# Patient Record
Sex: MALE | Race: WHITE | Employment: OTHER | URBAN - METROPOLITAN AREA
[De-identification: names, ages, dates, MRNs, and addresses within clinical notes are randomized per-mention and may not be internally consistent; named-entity substitution may affect disease eponyms.]

---

## 2017-10-09 DIAGNOSIS — H90.3 SENSORINEURAL HEARING LOSS, BILATERAL: Primary | ICD-10-CM

## 2017-10-10 ENCOUNTER — OFFICE VISIT (OUTPATIENT)
Dept: AUDIOLOGY | Facility: CLINIC | Age: 77
End: 2017-10-10

## 2017-10-10 DIAGNOSIS — H90.3 SENSORY HEARING LOSS, BILATERAL: Primary | ICD-10-CM

## 2017-10-10 DIAGNOSIS — H90.3 SENSORINEURAL HEARING LOSS, BILATERAL: ICD-10-CM

## 2017-10-10 NOTE — PROGRESS NOTES
"AUDIOLOGY REPORT    BACKGROUND INFORMATION: Hernesto Noland was seen in Audiology at the [facility] for cochlear implant equipment troubleshooting and discussion of equipment options on 10/10/17.  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      The patient was last in the clinic in 2011, when he upgraded to the Freedom for N22 processor.  Today he arrives wearing his backup Spectra processor, since the Freedom BTE is malfunctioning.  Dr. Ojeda ordered today's visit.     TEST RESULTS AND PROCEDURES: 15 minutes were spent troubleshooting equipment.  The patient arrives with #2 magnet in Spectra which is causing indentation in his skin (no skin breakdown).  This was reduced to 1/2 today.  The Freedom coil has a #1 magnet.  The patient is not using this device.  I will order another 1/2 magnet so we can update this at his next visit.      Patient reports Freedom processor now has \"static\" sound and increasing the volume to maximum no longer provides sufficient volume.  We discussed that this suggests a processor issue.  The device is obsolete and not repairable.  He would like to upgrade to N6 and keep using the Spectra in the meantime, with the Freedom serving as a 2nd backup.    We discussed the N6 processor in detail and completed the order form.  Patient indicated he would not use the wireless accessories and therefore selected an extra rechargeable battery and a  remote as his accessories.  Discussion of N6 order form and upgrade process via insurance took 15 minutes.     SUMMARY AND RECOMMENDATIONS: Cochlear implant equipment troubleshooting was completed today.  Magnet strength was reduced in the Spectra processor that the patient is wearing.  His [place] magnet will be updated at the next visit, since a magnet was ordered " today.  Magnet strength will be rechecked at this next visit.  Patient's Chappells processor has been determined to be faulty.  Since it is obsolete, we will proceed with N6 upgrade.  Letter of medical necessity, insurance information and order form will be sent to [employer] later this week, after Dr. Ojeda signs the letter of medical necessity.  Patient scheduled fitting for 12/7/17 and follow up for 1/4/18.  The equipment will ship to him and he will bring it to the fitting visit charged.  The patient expressed understanding and agreement with this plan.      Jennifer Pradhan, CCC-A  Licensed Audiologist  [place] #[id number]

## 2017-10-10 NOTE — MR AVS SNAPSHOT
After Visit Summary   10/10/2017    Hernesto Noland    MRN: [id number]           Patient Information     Date Of Birth          1940        Visit Information        Provider Department      10/10/2017 9:00 AM Cynthia Stein AuD M [facility] Audiology        Today's Diagnoses     Sensory hearing loss, bilateral    -  1    Sensorineural hearing loss, bilateral           Follow-ups after your visit        Follow-up notes from your care team     Return in about 8 weeks (around 2017) for CIR 90.      Your next 10 appointments already scheduled     Dec 07, 2017  7:00 AM CST   Cochlear Implant Return with Joselo Leach [facility] ([facility] [place])    [address]  [address] [facility] [address] [phone]              Who to contact     Please call your clinic at [phone] to:    Ask questions about your health    Make or cancel appointments    Discuss your medicines    Learn about your test results    Speak to your doctor   If you have compliments or concerns about an experience at your clinic, or if you wish to file a complaint, please contact [facility] Patient Relations at [phone] or email us at [email or web address]         Additional Information About Your Visit        MyChart Information     Share Your Braint is an electronic gateway that provides easy, online access to your medical records. With DiningCircle, you can request a clinic appointment, read your test results, renew a prescription or communicate with your care team.     To sign up for Share Your Braint visit the website at [email or web address]   You will be asked to enter the access code listed below, as well as some personal information. Please follow the directions to create your username and password.     Your access code is: [email or web address]  Expires: 2018  6:31 AM     Your access code will  in 90 days. If you need help or a new code, please  contact your [facility] or call [phone] for assistance.        Care EveryWhere ID     This is your Care EveryWhere ID. This could be used by other organizations to access your [place] medical records  [id number]         Blood Pressure from Last 3 Encounters:   No data found for BP    Weight from Last 3 Encounters:   No data found for Wt              We Performed the Following     AUDIOLOGY ADULT REFERRAL     Cochlear Implant Troubleshooting (24976)        Primary Care Provider Office Phone # Fax #    Ashlyn Chakraborty [phone]       [facility] [address]  [facility] [address]        Equal Access to Services     SAUL CHAVEZ : Hadii aad ku hadasho Soomaali, waaxda luqadaha, qaybta kaalmada adeegyada, waxay idiin hayaan adeeg milena ty . So [facility] [phone].    ATENCIÓN: Si habla español, tiene a hartman disposición servicios gratuitos de asistencia lingüística. [facility] [phone].    We comply with applicable federal civil rights laws and [place] laws. We do not discriminate on the basis of race, color, national origin, age, disability, sex, sexual orientation, or gender identity.            Thank you!     Thank you for choosing [facility] AUDIOLOGY  for your care. Our goal is always to provide you with excellent care. Hearing back from our patients is one way we can continue to improve our services. Please take a few minutes to complete the written survey that you may receive in the mail after your visit with us. Thank you!             Your Updated Medication List - Protect others around you: Learn how to safely use, store and throw away your medicines at www.disposemymeds.org.      Notice  As of 10/10/2017 10:41 AM    You have not been prescribed any medications.

## 2017-10-11 ENCOUNTER — TELEPHONE (OUTPATIENT)
Dept: AUDIOLOGY | Facility: CLINIC | Age: 77
End: 2017-10-11

## 2017-10-11 NOTE — TELEPHONE ENCOUNTER
Letter of medical necessity, N6 for N22 upgrade form, and insurance information were sent to [employer].        Kristi Pradhan., CCC-A  Licensed Audiologist  [place] #[id number]

## 2017-12-07 ENCOUNTER — OFFICE VISIT (OUTPATIENT)
Dept: AUDIOLOGY | Facility: CLINIC | Age: 77
End: 2017-12-07

## 2017-12-07 DIAGNOSIS — H90.3 SENSORY HEARING LOSS, BILATERAL: Primary | ICD-10-CM

## 2017-12-07 NOTE — PROGRESS NOTES
AUDIOLOGY REPORT    BACKGROUND INFORMATION: Dr. 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    PATIENT REPORT: Mr. Noland arrives on 12/7/17 in Audiology at the [facility] with his N6 for N22 upgrade kit.  He reports both his Spectra and his Freedom processor having recently stopped working and he can no longer get parts.     FITTING SESSION: Dr. Erick Ojeda, cochlear implant surgeon, ordered today's programming appointment. The patient came to the clinic for adjustment to the programs in the external speech processor and for assessment of the external components of the cochlear implant system. These components provide power and data to the internal device. Sound is only heard once the external portion is activated. Postoperative treatment, including device fitting and adjustment, audiologic assessments, and training are required at regular intervals.   Processor type: N6 for N22 fit today; Has malfunctioning Spectra and Freedom backups  Magnet strength: #1/2 + moleskin with N6 since there is a small area of irritated skin on his scalp.  Once this resolves, we will try to take the moleskin off.  Freedom previously had #1 magnet which was changed to #1/2 today.  Spectra was previously adjusted to #1/2.  He reports he previously screwed the magnet all the way down in the coil, against his head.  He was advised not to do that again due to the skin irritation.  No skin breakdown.     TEST RESULTS:   Electrode Impedances: Cannot test - N22  Neural Response Testing: Cannot test - N22  Facial Stimulation: Absent  Tinnitus: Absent  Balance Problems: Absent  Pain/Discomfort: Absent  Strategies Tried: SPEAK    Programs in N6:  1. (51) ADRO with wind reduction  2. (51) SCAN (ADRO + autosensitivity) with noise and wind  reduction  Telecoil is off. Volume and sensitivity are available in the remote.      Number of Channels per Program: 18 (22, 21, 3 & 2 are off)    COMMENTS: The preferred Freedom program #50 was converted to the N6 and volume was comfortable.  This was saved as program #51.  Patient requested wind reduction be added to the primary program but otherwise wanted it exactly like his old processor's program.  A SCAN program was also created with additional features.      All of the equipment in the N6 processor kit was reviewed including the  remote.  The patient was given time to practice use of the device and placement/removal of processor.  The patient's questions were answered.  Warranties were reviewed.    SUMMARY AND RECOMMENDATIONS: Mr. Noland was upgraded to the N6 for N22 processor today.  He will use moleskin for the next month and will return 1/4/18 for magnet/skin check, programming changes if needed, equipment review and speech perception testing.  The patient expressed understanding and agreement with this plan.      Jennifer Pradhan, CCC-A  Licensed Audiologist  [place] #[id number]

## 2017-12-07 NOTE — MR AVS SNAPSHOT
After Visit Summary   2017    Hernesto Noland    MRN: [id number]           Patient Information     Date Of Birth          1940        Visit Information        Provider Department      2017 7:00 AM Cynthia Stein AuD M [facility] Audiology        Today's Diagnoses     Sensory hearing loss, bilateral    -  1       Follow-ups after your visit        Your next 10 appointments already scheduled     2018  7:00 AM CST   Cochlear Implant Return with Joselo Leach [facility] Audiology ([facility] [place])    [address] [place] [address] [phone]              Who to contact     Please call your clinic at [phone] to:    Ask questions about your health    Make or cancel appointments    Discuss your medicines    Learn about your test results    Speak to your doctor   If you have compliments or concerns about an experience at your clinic, or if you wish to file a complaint, please contact [facility] Patient Relations at [phone] or email us at [email or web address]         Additional Information About Your Visit        MyChart Information     Gobble is an electronic gateway that provides easy, online access to your medical records. With Gobble, you can request a clinic appointment, read your test results, renew a prescription or communicate with your care team.     To sign up for Gobble visit the website at [email or web address]   You will be asked to enter the access code listed below, as well as some personal information. Please follow the directions to create your username and password.     Your access code is: [email or web address]  Expires: 2018  5:31 AM     Your access code will  in 90 days. If you need help or a new code, please contact your [facility] or call [phone] for assistance.        Care EveryWhere ID     This is your Care EveryWhere  ID. This could be used by other organizations to access your [place] medical records  [id number]         Blood Pressure from Last 3 Encounters:   No data found for BP    Weight from Last 3 Encounters:   No data found for Wt              We Performed the Following     LT: Diagnostic Analysis of CI 7 yrs & over, Initial Programming (39914)        Primary Care Provider Office Phone # Fax #    Ashlyn Chakraborty [phone]       [address] [address]        Equal Access to Services     [facility] Hospital[facility] : Hadii aad ku hadasho Soomaali, waaxda luqadaha, qaybta kaalmada adeegyada, waxay idiin hayaan adeeg raearatariq ty . So [facility] [phone].    ATENCIÓN: Si habla español, tiene a hartman disposición servicios gratuitos de asistencia lingüística. [facility] [phone].    We comply with applicable federal civil rights laws and [place] laws. We do not discriminate on the basis of race, color, national origin, age, disability, sex, sexual orientation, or gender identity.            Thank you!     Thank you for choosing [facility] AUDIOLOGY  for your care. Our goal is always to provide you with excellent care. Hearing back from our patients is one way we can continue to improve our services. Please take a few minutes to complete the written survey that you may receive in the mail after your visit with us. Thank you!             Your Updated Medication List - Protect others around you: Learn how to safely use, store and throw away your medicines at www.disposemymeds.org.      Notice  As of 12/7/2017  8:24 AM    You have not been prescribed any medications.

## 2017-12-11 ENCOUNTER — OFFICE VISIT (OUTPATIENT)
Dept: AUDIOLOGY | Facility: CLINIC | Age: 77
End: 2017-12-11

## 2017-12-11 DIAGNOSIS — H90.3 SENSORY HEARING LOSS, BILATERAL: Primary | ICD-10-CM

## 2017-12-11 NOTE — MR AVS SNAPSHOT
After Visit Summary   2017    Hernesto Noland    MRN: [id number]           Patient Information     Date Of Birth          1940        Visit Information        Provider Department      2017 7:00 AM Cynthia Stein AuD M [facility]        Today's Diagnoses     Sensory hearing loss, bilateral    -  1       Follow-ups after your visit        Your next 10 appointments already scheduled     2018  7:00 AM CST   Cochlear Implant Return with Joselo Leach [facility] ([email or web address] [place])    [address] [place] [address] [phone]              Who to contact     Please call your clinic at [phone] to:    Ask questions about your health    Make or cancel appointments    Discuss your medicines    Learn about your test results    Speak to your doctor   If you have compliments or concerns about an experience at your clinic, or if you wish to file a complaint, please contact [facility] Patient Relations at [phone] or email us at [email or web address]         Additional Information About Your Visit        MyChart Information     Newshubby is an electronic gateway that provides easy, online access to your medical records. With Newshubby, you can request a clinic appointment, read your test results, renew a prescription or communicate with your care team.     To sign up for Newshubby visit the website at [email or web address]   You will be asked to enter the access code listed below, as well as some personal information. Please follow the directions to create your username and password.     Your access code is: [email or web address]  Expires: 2018  5:31 AM     Your access code will  in 90 days. If you need help or a new code, please contact your [facility] or call [phone] for assistance.        Care EveryWhere ID     This is your Care  EveryWhere ID. This could be used by other organizations to access your [place] medical records  [id number]         Blood Pressure from Last 3 Encounters:   No data found for BP    Weight from Last 3 Encounters:   No data found for Wt              We Performed the Following     No Charge, Hearing Aid Clinic Visit        Primary Care Provider Office Phone # Fax #    Ashlyn Chakraborty [phone]       [facility] [address]  [facility] [address]        Equal Access to Services     PORFIRIO CHAVEZ : Hadii aad ku hadasho Soomaali, waaxda luqadaha, qaybta kaalmada adeegyada, waxay idiin hayaan adeeg kharash lakyle . So [facility] [phone].    ATENCIÓN: Si habla español, tiene a hartman disposición servicios gratuitos de asistencia lingüística. [facility] [phone].    We comply with applicable federal civil rights laws and [place] laws. We do not discriminate on the basis of race, color, national origin, age, disability, sex, sexual orientation, or gender identity.            Thank you!     Thank you for choosing [facility] AUDIOLOGY  for your care. Our goal is always to provide you with excellent care. Hearing back from our patients is one way we can continue to improve our services. Please take a few minutes to complete the written survey that you may receive in the mail after your visit with us. Thank you!             Your Updated Medication List - Protect others around you: Learn how to safely use, store and throw away your medicines at www.disposemymeds.org.      Notice  As of 12/11/2017  7:56 AM    You have not been prescribed any medications.

## 2017-12-11 NOTE — PROGRESS NOTES
AUDIOLOGY REPORT    BACKGROUND INFORMATION:  Dr. 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    The patient walked in to Audiology at the [facility] for cochlear implant troubleshooting on 12/11/17.  He was fit with an upgraded N6 processor on 12/7/17.  He reports he can no longer get his battery to twist onto the processor, like he could last week.      TEST RESULTS AND PROCEDURES: Visual inspection revealed that the processor lock was activated.  It was moved to the unlock position and then the battery twisted on without issue.  This was demonstrated to the patient.    It was noted that he had removed the moleskin from the coil which is using a 1/2 magnet.  The skin irritation that was noted last week from the old processor was no longer present and retention felt appropriate today.     SUMMARY AND RECOMMENDATIONS: Patient was seen for cochlear implant troubleshooting and issues were resolved with processor battery lock.  Patient is using 1/2 magnet without moleskin with no irritation.  He will return for follow up in January 2018 as planned.    The patient expressed understanding and agreement with this plan.      Kristi Pradhan., CCC-A  Licensed Audiologist  [place] #[id number]

## 2018-01-04 ENCOUNTER — OFFICE VISIT (OUTPATIENT)
Dept: OTOLARYNGOLOGY | Facility: CLINIC | Age: 78
End: 2018-01-04
Payer: MEDICARE

## 2018-01-04 ENCOUNTER — OFFICE VISIT (OUTPATIENT)
Dept: AUDIOLOGY | Facility: CLINIC | Age: 78
End: 2018-01-04
Payer: MEDICARE

## 2018-01-04 DIAGNOSIS — H90.3 SENSORY HEARING LOSS, BILATERAL: Primary | ICD-10-CM

## 2018-01-04 DIAGNOSIS — H90.3 SENSORINEURAL HEARING LOSS (SNHL) OF BOTH EARS: Primary | ICD-10-CM

## 2018-01-04 DIAGNOSIS — H61.23 BILATERAL IMPACTED CERUMEN: ICD-10-CM

## 2018-01-04 PROBLEM — E87.6 HYPOKALEMIA: Status: ACTIVE | Noted: 2017-11-06

## 2018-01-04 PROBLEM — R19.5 LOOSE STOOLS: Status: ACTIVE | Noted: 2017-11-06

## 2018-01-04 PROBLEM — H25.093 AGE-RELATED INCIPIENT CATARACT OF BOTH EYES: Status: ACTIVE | Noted: 2017-08-09

## 2018-01-04 PROBLEM — S72.001P CLOSED FRACTURE OF RIGHT HIP WITH MALUNION: Status: ACTIVE | Noted: 2017-10-26

## 2018-01-04 RX ORDER — DORZOLAMIDE HYDROCHLORIDE AND TIMOLOL MALEATE 20; 5 MG/ML; MG/ML
1 SOLUTION/ DROPS OPHTHALMIC
COMMUNITY

## 2018-01-04 RX ORDER — OFLOXACIN 3 MG/ML
2 SOLUTION AURICULAR (OTIC) 2 TIMES DAILY
Qty: 2 ML | Refills: 0 | Status: SHIPPED | OUTPATIENT
Start: 2018-01-04 | End: 2018-01-14

## 2018-01-04 NOTE — PROGRESS NOTES
The patient presents with a history of cerumen impaction in the ears.  The patient has profound hearing loss and he was scheduled to work with the Audiologists today, but his ears were severely impacted. The patient denies sinusitis, rhinitis, facial pain, nasal obstruction or purulent nasal discharge. The patient denies chronic or recurrent tonsillitis, chronic or recurrent pharyngitis. The patient denies otalgia, otorrhea, eustachian tube dysfunction, ear infections, dizziness or tinnitus.     This patient is seen in consultation at the request of Gabrielle Ruiz.     All other systems were reviewed and they are either negative or they are not directly pertinent to this Otolaryngology examination.      Past Medical History:    No past medical history on file.    Past Surgical History:    No past surgical history on file.    Medications:    No current outpatient prescriptions on file.    Allergies:    Review of patient's allergies indicates not on file.    Physical Examination:    The patient is a well developed, well nourished male in no apparent distress.  He is normocephalic, atraumatic with pupils equally round and reactive to light.    Oral Cavity Examination:  Normal mucosa with no masses or lesions  Nasal Examination: Normal mucosa with no masses or lesions  Ear Examination: Ear canals impacted with cerumen bilaterally.  The ear canals are cleaned today using an alligator forceps, a currette, and a suction using a binocular microscope for visualization. The tympanic membranes and middle ear spaces normal bilaterally.  Neurological Examination: Facial nerve function intact and symmetric  Integumentary Examination: No lesions on the skin of the head and neck  Neck Examination: No masses or lesions, no lymphadenopathy  Endocrine Examination: Normal thyroid examination    Assessment and Plan:    The patient presents with a history of cerumen impaction.  The patient's ears are cleaned today. He will use floxin drops  for one week and will proceed with his Audiology visit.

## 2018-01-04 NOTE — PATIENT INSTRUCTIONS
The patient presents with a history of cerumen impaction.  The patient's ears are cleaned today. He will use floxin drops for one week and will proceed with his Audiology visit.

## 2018-01-04 NOTE — PROGRESS NOTES
AUDIOLOGY REPORT    METHOD: Speech perception testing is conducted at regular intervals to determine the degree of benefit the patient is obtaining from the cochlear implant (CI). Tests are conducted using the cochlear implant without the benefit of lipreading. All tests are conducted in a sound-treated room. Perception of monosyllabic words and words in sentences are tested. Results usually show improvement over time. A decrease in performance indicates the need for re-programming of the prosthesis and/or replacement of components.     INTERVAL: 22 years     BACKGROUND: Dr. 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    The patient was seen for upgrade follow up and cochlear implant testing in to Audiology at the [facility] on 1/4/18.  He was fit with an upgraded N6 processor on 12/7/17.     Today's evaluation was ordered by Dr. Ojeda.    PATIENT REPORT: The patient reports he has adjusted well to the new N6 for N22 processor.  The patient reports he hears much better with the new processor than he did with his old equipment.  He does not feel any programming changes or equipment review are needed.  He feels ready for testing.       TEST RESULTS:  35 minutes were spent assessing the patient s auditory rehabilitation status.    Device(s) used for Testing:   Right ear: Not aided  Left ear: N6 for N22 cochlear implant sound processor; #1/2 magnet without irritation    Soundfield Aided Thresholds: Detection in borderline normal to mild loss range  Otoscopy: Significant cerumen bilaterally, possibly fully occluding  Tympanograms: Flat bilaterally.  Volume is smaller on right side compared to left and smaller than typical bilaterally.  Recordings likely affected by cerumen.  Therefore, I contacted Dr. Martinez's clinic and  Dr. Martinez indicated he would add the patient on for ear cleaning and middle ear assessment after today's Audiology appointment.   Unaided thresholds: Did not test due to cerumen    NOTE: No previous preoperative or postoperative test results could be found, though testing was likely done at 70 dB SPL in the past.  Testing today was done at 60 dB A to be consistent with current testing recommendations.  While today's results could be compared to any past results, they can be used for future monitoring.     CNC Words Test:  The patient repeats 25 single syllable words, auditory only. The words are presented at 60 dB A (conversational level) delivered from a CD player.    Preoperative Performance: Could not locate results.     Left CI:  22 years (today): 12%       The Hearing in Noise Test (HINT):  The patient repeats 20 sentences, auditory only.   The sentences are presented in each condition at 60 dB A (conversational level) delivered from a CD player.    Preoperative Performance: Could not locate results.     Left CI:  22 years (today): 26%       SUMMARY AND RECOMMENDATIONS: Mr. Noland has adapted well to the new TDX for N22 sound processor.  Audibility is appropriate and no programming changes were advised based on the results.  Previous speech perception scores could not be located but today's results can be used for future monitoring.  Scores are below average but patient denies changes in performance over time.  There is no longer irritation from the CI magnet.  Patient should return to Audiology in 1 year for repeat testing and follow up.  He is aware of how to change the microphone filters on the processor in the meantime.      Patient has cerumen impaction bilaterally, which likely affected tympanograms today (flat bilaterally).  Patient will be added onto Dr. Martinez's clinic today for ear cleaning and assessment of middle ears.      The patient expressed understanding and agreement with this  plan.      Jennifer Pradhan, CCC-A  Licensed Audiologist  [place] #[id number]    Enclosure: audiogram

## 2018-01-04 NOTE — MR AVS SNAPSHOT
After Visit Summary   2018    Hernesto Noland    MRN: [id number]           Patient Information     Date Of Birth          1940        Visit Information        Provider Department      2018 7:15 AM Rahat Martinez MD [facility]        Today's Diagnoses     Sensorineural hearing loss (SNHL) of both ears    -  1    Bilateral impacted cerumen          Care Instructions    The patient presents with a history of cerumen impaction.  The patient's ears are cleaned today. He will use floxin drops for one week and will proceed with his Audiology visit.           Follow-ups after your visit        Who to contact     Please call your clinic at [phone] to:    Ask questions about your health    Make or cancel appointments    Discuss your medicines    Learn about your test results    Speak to your doctor   If you have compliments or concerns about an experience at your clinic, or if you wish to file a complaint, please contact [facility] Patient Relations at [phone] or email us at [email or web address]         Additional Information About Your Visit        MyChart Information     ProteoMediX is an electronic gateway that provides easy, online access to your medical records. With ProteoMediX, you can request a clinic appointment, read your test results, renew a prescription or communicate with your care team.     To sign up for ProteoMediX visit the website at [email or web address]   You will be asked to enter the access code listed below, as well as some personal information. Please follow the directions to create your username and password.     Your access code is: [email or web address]  Expires: 2018  5:31 AM     Your access code will  in 90 days. If you need help or a new code, please contact your [facility] or call [phone] for assistance.        Care EveryWhere ID     This is your Care EveryWhere  ID. This could be used by other organizations to access your [facility] medical records  [id number]         Blood Pressure from Last 3 Encounters:   No data found for BP    Weight from Last 3 Encounters:   No data found for Wt              We Performed the Following     REMOVE IMPACTED CERUMEN          Today's Medication Changes          These changes are accurate as of: 1/4/18  7:45 AM.  If you have any questions, ask your nurse or doctor.               Start taking these medicines.        Dose/Directions    ofloxacin 0.3 % otic solution   Commonly known as:  FLOXIN   Used for:  Bilateral impacted cerumen   Started by:  Rahat Martinez MD        Dose:  2 drop   Place 2 drops into both ears 2 times daily for 10 days   Quantity:  2 mL   Refills:  0            Where to get your medicines      These medications were sent to [facility] [address] [place] [address] [address] [place] [id number]    Hours:  TRANSPLANT PHONE NUMBER [phone] Phone:  [phone]     ofloxacin 0.3 % otic solution                Primary Care Provider Office Phone # Fax #    Ashlyn Chakraborty [phone]       [facility] [address]  [facility] [address]        Equal Access to Services     PORFIRIO CHAVEZ AH: Hadii shadi ku hadasho Soomaali, waaxda luqadaha, qaybta kaalmada adeegyada, waxay idiin hayscottyn su guo. So [facility] [phone].    ATENCIÓN: Si habla español, tiene a hartman disposición servicios gratuitos de asistencia lingüística. Llame al [phone].    We comply with applicable federal civil rights laws and [place] laws. We do not discriminate on the basis of race, color, national origin, age, disability, sex, sexual orientation, or gender identity.            Thank you!     Thank you for choosing [facility] EAR NOSE AND THROAT  for your care. Our goal is always to provide you with excellent care. Hearing back  from our patients is one way we can continue to improve our services. Please take a few minutes to complete the written survey that you may receive in the mail after your visit with us. Thank you!             Your Updated Medication List - Protect others around you: Learn how to safely use, store and throw away your medicines at www.disposemymeds.org.          This list is accurate as of: 1/4/18  7:45 AM.  Always use your most recent med list.                   Brand Name Dispense Instructions for use Diagnosis    ofloxacin 0.3 % otic solution    FLOXIN    2 mL    Place 2 drops into both ears 2 times daily for 10 days    Bilateral impacted cerumen

## 2018-01-04 NOTE — MR AVS SNAPSHOT
After Visit Summary   2018    Hernesto Noland    MRN: [id number]           Patient Information     Date Of Birth          1940        Visit Information        Provider Department      2018 7:00 AM Cynthia Stein, Joselo ENGLE [facility] Audiology        Today's Diagnoses     Sensory hearing loss, bilateral    -  1       Follow-ups after your visit        Follow-up notes from your care team     Return in about 1 year (around 2019) for CIR 90.      Who to contact     Please call your clinic at [phone] to:    Ask questions about your health    Make or cancel appointments    Discuss your medicines    Learn about your test results    Speak to your doctor   If you have compliments or concerns about an experience at your clinic, or if you wish to file a complaint, please contact [facility] Patient Relations at [phone] or email us at [email or web address]         Additional Information About Your Visit        MyChart Information     MicroQuantt is an electronic gateway that provides easy, online access to your medical records. With Branch, you can request a clinic appointment, read your test results, renew a prescription or communicate with your care team.     To sign up for MicroQuantt visit the website at [email or web address]   You will be asked to enter the access code listed below, as well as some personal information. Please follow the directions to create your username and password.     Your access code is: [email or web address]  Expires: 2018  5:31 AM     Your access code will  in 90 days. If you need help or a new code, please contact your [facility] Clinic or call [phone] for assistance.        Care EveryWhere ID     This is your Care EveryWhere ID. This could be used by other organizations to access your [facility] medical records  [id number]         Blood Pressure from Last 3 Encounters:   No data found for BP     Weight from Last 3 Encounters:   No data found for Wt              We Performed the Following     AUDIOGRAM/TYMPANOGRAM - INTERFACE     Eval of Aud Rehab Status (60 min)   (60132)     Tympanometry (impedance - testing) (73688)        Primary Care Provider Office Phone # Fax #    Ashlyn Chakraborty [phone]       [facility] [address]  [facility] [address]        Equal Access to Services     SAUL [facility] : Hadii aad ku hadasho Soomaali, waaxda luqadaha, qaybta kaalmada adeegyada, waxay idiin hayaan adeeg kharash la'aan . So [facility] [phone].    ATENCIÓN: Si habla español, tiene a hartman disposición servicios gratuitos de asistencia lingüística. Ll[facility] [phone].    We comply with applicable federal civil rights laws and [place] laws. We do not discriminate on the basis of race, color, national origin, age, disability, sex, sexual orientation, or gender identity.            Thank you!     Thank you for choosing [facility] AUDIOLOGY  for your care. Our goal is always to provide you with excellent care. Hearing back from our patients is one way we can continue to improve our services. Please take a few minutes to complete the written survey that you may receive in the mail after your visit with us. Thank you!             Your Updated Medication List - Protect others around you: Learn how to safely use, store and throw away your medicines at www.disposemymeds.org.      Notice  As of 1/4/2018  7:44 AM    You have not been prescribed any medications.

## 2018-01-04 NOTE — LETTER
1/4/2018       RE: Hernesto Noland  [address]  [place] [address]     Dear Colleague,    Thank you for referring your patient, Hernesto Noland, to the [facility] E[facility] TH[facility]AT at [facility]. Please see a copy of my visit note below.    The patient presents with a history of cerumen impaction in the ears.  The patient has profound hearing loss and he was scheduled to work with the Audiologists today, but his ears were severely impacted. The patient denies sinusitis, rhinitis, facial pain, nasal obstruction or purulent nasal discharge. The patient denies chronic or recurrent tonsillitis, chronic or recurrent pharyngitis. The patient denies otalgia, otorrhea, eustachian tube dysfunction, ear infections, dizziness or tinnitus.     This patient is seen in consultation at the request of Gabrielle Ruiz.     All other systems were reviewed and they are either negative or they are not directly pertinent to this Otolaryngology examination.      Past Medical History:    No past medical history on file.    Past Surgical History:    No past surgical history on file.    Medications:    No current outpatient prescriptions on file.    Allergies:    Review of patient's allergies indicates not on file.    Physical Examination:    The patient is a well developed, well nourished male in no apparent distress.  He is normocephalic, atraumatic with pupils equally round and reactive to light.    Oral Cavity Examination:  Normal mucosa with no masses or lesions  Nasal Examination: Normal mucosa with no masses or lesions  Ear Examination: Ear canals impacted with cerumen bilaterally.  The ear canals are cleaned today using an alligator forceps, a currette, and a suction using a binocular microscope for visualization. The tympanic membranes and middle ear spaces normal bilaterally.  Neurological Examination: Facial nerve function intact and symmetric  Integumentary Examination: No lesions on  the skin of the head and neck  Neck Examination: No masses or lesions, no lymphadenopathy  Endocrine Examination: Normal thyroid examination    Assessment and Plan:    The patient presents with a history of cerumen impaction.  The patient's ears are cleaned today. He will use floxin drops for one week and will proceed with his Audiology visit.         Again, thank you for allowing me to participate in the care of your patient.      Sincerely,    Rahat Martinez MD

## 2019-02-04 ENCOUNTER — RECORDS - HEALTHEAST (OUTPATIENT)
Dept: LAB | Facility: CLINIC | Age: 79
End: 2019-02-04

## 2019-02-04 LAB
ALBUMIN SERPL-MCNC: 2 G/DL (ref 3.5–5)
ALP SERPL-CCNC: 149 U/L (ref 45–120)
ALT SERPL W P-5'-P-CCNC: 20 U/L (ref 0–45)
ANION GAP SERPL CALCULATED.3IONS-SCNC: 9 MMOL/L (ref 5–18)
AST SERPL W P-5'-P-CCNC: 25 U/L (ref 0–40)
BASOPHILS # BLD AUTO: 0.1 THOU/UL (ref 0–0.2)
BASOPHILS NFR BLD AUTO: 1 % (ref 0–2)
BILIRUB SERPL-MCNC: 0.4 MG/DL (ref 0–1)
BUN SERPL-MCNC: 13 MG/DL (ref 8–28)
CALCIUM SERPL-MCNC: 8.7 MG/DL (ref 8.5–10.5)
CHLORIDE BLD-SCNC: 109 MMOL/L (ref 98–107)
CO2 SERPL-SCNC: 20 MMOL/L (ref 22–31)
CREAT SERPL-MCNC: 0.96 MG/DL (ref 0.7–1.3)
EOSINOPHIL # BLD AUTO: 0.3 THOU/UL (ref 0–0.4)
EOSINOPHIL NFR BLD AUTO: 3 % (ref 0–6)
ERYTHROCYTE [DISTWIDTH] IN BLOOD BY AUTOMATED COUNT: 14.9 % (ref 11–14.5)
GFR SERPL CREATININE-BSD FRML MDRD: >60 ML/MIN/1.73M2
GLUCOSE BLD-MCNC: 109 MG/DL (ref 70–125)
HCT VFR BLD AUTO: 32.3 % (ref 40–54)
HGB BLD-MCNC: 10.1 G/DL (ref 14–18)
LYMPHOCYTES # BLD AUTO: 1 THOU/UL (ref 0.8–4.4)
LYMPHOCYTES NFR BLD AUTO: 13 % (ref 20–40)
MCH RBC QN AUTO: 31.4 PG (ref 27–34)
MCHC RBC AUTO-ENTMCNC: 31.3 G/DL (ref 32–36)
MCV RBC AUTO: 100 FL (ref 80–100)
MONOCYTES # BLD AUTO: 0.5 THOU/UL (ref 0–0.9)
MONOCYTES NFR BLD AUTO: 7 % (ref 2–10)
NEUTROPHILS # BLD AUTO: 5.8 THOU/UL (ref 2–7.7)
NEUTROPHILS NFR BLD AUTO: 76 % (ref 50–70)
PLATELET # BLD AUTO: 394 THOU/UL (ref 140–440)
PMV BLD AUTO: 9.7 FL (ref 8.5–12.5)
POTASSIUM BLD-SCNC: 3.2 MMOL/L (ref 3.5–5)
PROT SERPL-MCNC: 7 G/DL (ref 6–8)
RBC # BLD AUTO: 3.22 MILL/UL (ref 4.4–6.2)
SODIUM SERPL-SCNC: 138 MMOL/L (ref 136–145)
WBC: 7.7 THOU/UL (ref 4–11)

## 2019-02-05 ENCOUNTER — RECORDS - HEALTHEAST (OUTPATIENT)
Dept: LAB | Facility: CLINIC | Age: 79
End: 2019-02-05

## 2019-02-05 LAB
ALBUMIN SERPL-MCNC: 2.2 G/DL (ref 3.5–5)
ALP SERPL-CCNC: 163 U/L (ref 45–120)
ALT SERPL W P-5'-P-CCNC: 18 U/L (ref 0–45)
ANION GAP SERPL CALCULATED.3IONS-SCNC: 10 MMOL/L (ref 5–18)
AST SERPL W P-5'-P-CCNC: 21 U/L (ref 0–40)
BILIRUB SERPL-MCNC: 0.5 MG/DL (ref 0–1)
BUN SERPL-MCNC: 12 MG/DL (ref 8–28)
CALCIUM SERPL-MCNC: 8.5 MG/DL (ref 8.5–10.5)
CHLORIDE BLD-SCNC: 108 MMOL/L (ref 98–107)
CO2 SERPL-SCNC: 21 MMOL/L (ref 22–31)
CREAT SERPL-MCNC: 0.9 MG/DL (ref 0.7–1.3)
ERYTHROCYTE [DISTWIDTH] IN BLOOD BY AUTOMATED COUNT: 14.8 % (ref 11–14.5)
GFR SERPL CREATININE-BSD FRML MDRD: >60 ML/MIN/1.73M2
GLUCOSE BLD-MCNC: 78 MG/DL (ref 70–125)
HCT VFR BLD AUTO: 34.1 % (ref 40–54)
HGB BLD-MCNC: 11 G/DL (ref 14–18)
LEVETIRACETAM (KEPPRA): 48.9 UG/ML (ref 6–46)
MCH RBC QN AUTO: 31.6 PG (ref 27–34)
MCHC RBC AUTO-ENTMCNC: 32.3 G/DL (ref 32–36)
MCV RBC AUTO: 98 FL (ref 80–100)
PLATELET # BLD AUTO: 414 THOU/UL (ref 140–440)
PMV BLD AUTO: 9.5 FL (ref 8.5–12.5)
POTASSIUM BLD-SCNC: 3.9 MMOL/L (ref 3.5–5)
PROT SERPL-MCNC: 6.7 G/DL (ref 6–8)
RBC # BLD AUTO: 3.48 MILL/UL (ref 4.4–6.2)
SODIUM SERPL-SCNC: 139 MMOL/L (ref 136–145)
WBC: 8.3 THOU/UL (ref 4–11)

## 2019-02-09 ENCOUNTER — RECORDS - HEALTHEAST (OUTPATIENT)
Dept: LAB | Facility: CLINIC | Age: 79
End: 2019-02-09

## 2019-02-11 LAB
ALBUMIN SERPL-MCNC: 2.1 G/DL (ref 3.5–5)
ALP SERPL-CCNC: 153 U/L (ref 45–120)
ALT SERPL W P-5'-P-CCNC: <9 U/L (ref 0–45)
ANION GAP SERPL CALCULATED.3IONS-SCNC: 10 MMOL/L (ref 5–18)
AST SERPL W P-5'-P-CCNC: 14 U/L (ref 0–40)
BILIRUB SERPL-MCNC: 0.4 MG/DL (ref 0–1)
BUN SERPL-MCNC: 11 MG/DL (ref 8–28)
CALCIUM SERPL-MCNC: 8.9 MG/DL (ref 8.5–10.5)
CHLORIDE BLD-SCNC: 110 MMOL/L (ref 98–107)
CO2 SERPL-SCNC: 20 MMOL/L (ref 22–31)
CREAT SERPL-MCNC: 0.91 MG/DL (ref 0.7–1.3)
GFR SERPL CREATININE-BSD FRML MDRD: >60 ML/MIN/1.73M2
GLUCOSE BLD-MCNC: 72 MG/DL (ref 70–125)
POTASSIUM BLD-SCNC: 3.8 MMOL/L (ref 3.5–5)
PROT SERPL-MCNC: 7.2 G/DL (ref 6–8)
SODIUM SERPL-SCNC: 140 MMOL/L (ref 136–145)

## 2019-02-21 ENCOUNTER — RECORDS - HEALTHEAST (OUTPATIENT)
Dept: LAB | Facility: CLINIC | Age: 79
End: 2019-02-21

## 2019-02-21 LAB — LEVETIRACETAM (KEPPRA): 26 UG/ML (ref 6–46)

## 2019-04-16 ENCOUNTER — TELEPHONE (OUTPATIENT)
Dept: AUDIOLOGY | Facility: CLINIC | Age: 79
End: 2019-04-16

## 2019-04-16 NOTE — TELEPHONE ENCOUNTER
Received call from patient's wife, Danae.  Hernesto is in TCU at [facility] and his cochlear implant is providing intermittent sound.  Danae was able to locate the unopened backup cable.  She will place this on his processor when she is back to see him in the TCU tomorrow.  I ordered a replacement cable (service request [id number]) so that they will get a new backup to have on hand.  It will ship to the patient's home.  The patient's wife will contact the clinic if the replacement cable does not resolve the issues.  Danae expressed understanding and agreement with this plan.      Kristi Pradhan., CCC-A  Licensed Audiologist  [place] #[id number]

## 2019-04-18 ENCOUNTER — TELEPHONE (OUTPATIENT)
Dept: AUDIOLOGY | Facility: CLINIC | Age: 79
End: 2019-04-18

## 2019-04-18 NOTE — TELEPHONE ENCOUNTER
Danae, patient's wife, called clinic with update.  She replaced the cable on Hernesto's cochlear implant.  It is no longer intermittent but he feels the cochlear implant isn't as loud as it used to be and he has not changed the volume.  Danae requested that processor be exchanged.  RMA [id number] was submitted.  Replacement will ship to patient's home.  He is in TCU at [facility].  Danae will take replacement processor there and he will try it.  If that doesn't resolve the issue, he could try adjusting the volume with his remote but Danae is unsure where it is as he has never used it before.  She will look for it and will charge it and pair it with the replacement processor if the remote is found.  Patient is unable to come into the clinic for troubleshooting due to being in the TCU.  Danae will update the clinic if problems continue.  If no resolution, we could consider exchanging coil next.         Jennifer Pradhan, CCC-A  Licensed Audiologist  [place] #[id number]

## 2019-04-24 ENCOUNTER — TELEPHONE (OUTPATIENT)
Dept: AUDIOLOGY | Facility: CLINIC | Age: 79
End: 2019-04-24

## 2019-04-24 NOTE — TELEPHONE ENCOUNTER
Patient's wife Danae called.  She received replacement cochlear implant sound processor for Hernesto.  She is unsure how to connect it.  She will be going to see Hernesto in TCU later this morning.  I walked her through how to unplug the cable from the old processor and how to plug it into the new processor.  Advised her that if she needs support while with Hernesto to call Cochlear's recipient troubleshooting line for immediate support [phone].  She will also let [facility] know if there are any ongoing issues once Hernesto is using the new processor.  Danae expressed understanding and agreement with this plan.      Jennifer Pradhan, CCC-A  Licensed Audiologist  [place] #[id number]

## 2019-05-01 ENCOUNTER — TELEPHONE (OUTPATIENT)
Dept: AUDIOLOGY | Facility: CLINIC | Age: 79
End: 2019-05-01

## 2019-05-01 NOTE — TELEPHONE ENCOUNTER
Patient's wife called.  He got new cochlear implant equipment but gets steady yellow light error.  Advised wife to make sure cable is completely plugged into processor.  After this, the yellow light error resolved and processor began working as it should.  They will reach out to the clinic if any additional support is needed.       Jennifer Pradhan, CCC-A  Licensed Audiologist  [place] #[id number]

## 2019-05-02 ENCOUNTER — TELEPHONE (OUTPATIENT)
Dept: OTOLARYNGOLOGY | Facility: CLINIC | Age: 79
End: 2019-05-02

## 2019-05-02 DIAGNOSIS — H91.90 CHANGE IN HEARING: Primary | ICD-10-CM

## 2019-05-02 NOTE — TELEPHONE ENCOUNTER
----- Message from Joselo Leach sent at 5/2/2019  4:36 AM CDT -----  Regarding: order for CI patient  Please enter order for cochlear implant programming and testing.  This patient saw Dr. Ojeda in past.  Please enter under Dr. Cueavs.  She indicated she would sign for Dr Ojeda's CI patients    Thanks,  DH

## 2019-05-08 ENCOUNTER — TELEPHONE (OUTPATIENT)
Dept: AUDIOLOGY | Facility: CLINIC | Age: 79
End: 2019-05-08

## 2019-05-08 NOTE — TELEPHONE ENCOUNTER
Received call from patient's wife Danae.  Hernesto is now out of hospital and home.  She still feels he isn't hearing his best with the cochlear implant after equipment exchanges.  They would like to come in for appointment.  Patient will be scheduled for 5/14/19 at 7:30.  Danae confirmed time.      Jennifer Pradhan, CCC-A  Licensed Audiologist  [place] #[id number]

## 2019-05-14 ENCOUNTER — OFFICE VISIT (OUTPATIENT)
Dept: AUDIOLOGY | Facility: CLINIC | Age: 79
End: 2019-05-14
Payer: COMMERCIAL

## 2019-05-14 DIAGNOSIS — H90.3 SENSORY HEARING LOSS, BILATERAL: Primary | ICD-10-CM

## 2019-05-14 NOTE — PROGRESS NOTES
AUDIOLOGY REPORT    METHOD: Speech perception testing is conducted at regular intervals to determine the degree of benefit the patient is obtaining from the cochlear implant (CI). Tests are conducted using the cochlear implant without the benefit of lipreading. All tests are conducted in a sound-treated room. Perception of monosyllabic words and words in sentences are tested. Results usually show improvement over time. A decrease in performance indicates the need for re-programming of the prosthesis and/or replacement of components.     INTERVAL: 23 years     BACKGROUND: Dr. 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    The patient was seen for cochlear implant troubleshooting, programming and testing in Audiology at the [facility] [place] on 5/14/19.  He was accompanied today by his wife Danae.      Today's evaluation was ordered by Dr. Cuevas.    PATIENT REPORT: The patient has received replacement N6 processor and cable but is here for troubleshooting since his wife reports it isn't working despite our efforts to troubleshoot over the phone.    Of note, the patient fell in January 2019 and hit the back of his head, not near his cochlear implant.  His device continued to work until April 2019 when there were concerns about external equipment.  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  Patient reportedly had been free of seizures for about 27 years before they returned.  His physicians are managing all of these current issues and he has recently returned home from transitional care.      Patient has always relied heavily on lipreading to communicate even with his CI.        TEST RESULTS:    Troubleshooting revealed that the  patient had a Freedom cable/coil plugged into the N6 processor.  When this was replaced with an N6 coil, it still wasn't working because the cable was not firmly pushed into the coil.  When this was fixed, the N6 device started working.      Programming changes were then made to ensure the volume was loud enough.      FITTING SESSION: Dr. Fernanda Cuevas ordered today's programming appointment. The patient came to the clinic for adjustment to the programs in the external speech processor and for assessment of the external components of the cochlear implant system. These components provide power and data to the internal device. Sound is only heard once the external portion is activated. Postoperative treatment, including device fitting and adjustment, audiologic assessments, and training are required at regular intervals.     Processor type: Left N6 for N22; Has malfunctioning Spectra and Freedom backups (right ear is unaided)  Magnet strength: #1/2 without irritation or discomfort.      TEST RESULTS:   Electrode Impedances: Cannot test - N22  Neural Response Testing: Cannot test - N22  Facial Stimulation: Absent  Tinnitus: Absent  Balance Problems: Absent  Pain/Discomfort: Absent  Strategies Tried: SPEAK    Programs in N6:  1. (52) ADRO with wind reduction  2. (52) SCAN (ADRO + autosensitivity) with noise and wind reduction  Telecoil is off. Volume and sensitivity are available in the remote.      Number of Channels per Program: 18 (22, 21, 3 & 2 are off)    COMMENTS: Threshold (T) and comfort (C) levels were increased globally to comfort (8 units) in the left CI.  This was saved as map #52 in the N6.  The Freedom and Spectra were not adjusted today as the patient did not bring them.      After troubleshooting and programming changes, Mr. Noland reported that he was hearing as well as in the past.      35 minutes were spent completing lyons testing.  Attempts were made to complete soundfield thresholds (fresh noise or  "warbled tones) with the patient wearing the left CI, but the patient did not reliably respond to stimuli and thresholds were around 50 dB (suprathreshold), despite speech detection being at 25 dB when wearing his CI.  He did not understand instructions for CNC words or HINT sentence testing.  Therefore, Ling sounds were tested in the soundfield.  The patient was able to detect all Lings at 30 dB except for \"ah\" which he detected at 25 dB.  These thresholds are consistent with the patient's last soundfield thresholds using fresh noise in January 2018.       Otoscopy: Fully occluding cerumen bilaterally  Tympanograms: Did not test due to cerumen      SUMMARY AND RECOMMENDATIONS: Mr. Noland was seen for cochlear implant troubleshooting and it was noted he had the incorrect cable/coil attached to his processor.  He began hearing again after he switched back to the N6 cable and coil and after they were pushed tightly together.  Programming changes were made to increase volume.  Patient had difficulty understanding the tests, (his wife reports cognitive changes after recent seizures) but we were able to confirm detection of sound consistent with testing from 1 year ago.  Patient reported that subjectively he was hearing as well as in the past despite not being able to reliabily participate in many of the tests today.  Patient will return if any concerns arise after the equipment and programming changes today.  An integrity test with Cochlear could be considered if concerns continue.      Patient has cerumen impaction bilaterally and was advised to schedule an ear cleaning with his PCP or in ENT Clinic (he last saw Dr. Martinez in January 2018).      The patient expressed understanding and agreement with this plan.      Jennifer Pradhan, CCC-A  Licensed Audiologist  [place] #[id number]    Enclosure: audiogram    "

## 2019-07-02 ENCOUNTER — TELEPHONE (OUTPATIENT)
Dept: AUDIOLOGY | Facility: CLINIC | Age: 79
End: 2019-07-02

## 2019-07-02 NOTE — TELEPHONE ENCOUNTER
Patient's wife Danae called the clinic, reporting that his rechargeable cochlear implant batteries last only a few hours.  Software estimates are about 12 hours.  Since the age of the batteries could be the issue, an RMA was submitted for the battery that is under warranty.  It will ship to the patient directly.  He will efraín the batteries and track whether the new battery takes care of the issue.  He will notify the clinic if this does not resolve the problem so we can explore the  (out of warranty) status as a next step if needed.      Jennifer Pradhan, CCC-A  Licensed Audiologist  [place] #[id number]

## 2019-10-24 ENCOUNTER — TELEPHONE (OUTPATIENT)
Dept: AUDIOLOGY | Facility: CLINIC | Age: 79
End: 2019-10-24

## 2019-11-03 NOTE — PROGRESS NOTES
AUDIOLOGY REPORT    BACKGROUND: Dr. 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    The patient was seen for cochlear implant troubleshooting and programming in Audiology at the [facility] on 11/6/19.  He was accompanied today by his wife Danae.      Today's evaluation was ordered by Dr. Cuevas.    PATIENT REPORT: Patient is here today because of concerns about hearing with his cochlear implant.  Of note, the patient fell in January 2019 and hit the back of his head, not near his cochlear implant.  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  Patient reportedly had been free of seizures for about 27 years before they returned.  His physicians are managing all of these current issues and he returned home from transitional care in the spring of 2019.  When he was seen in May 2019 in clinic he was unable to reliably participate in lyons testing.  Patient has always relied heavily on lipreading to communicate even with his CI.        FITTING SESSION: Dr. Fernanda Cuevas ordered today's programming appointment. The patient came to the clinic for adjustment to the programs in the external speech processor and for assessment of the external components of the cochlear implant system. These components provide power and data to the internal device. Sound is only heard once the external portion is activated. Postoperative treatment, including device fitting and adjustment, audiologic assessments, and training are required at regular intervals.     Processor type: Left N6 for N22; Has malfunctioning Spectra and Freedom backups (right ear is unaided)  Magnet strength: #1/2 without irritation or discomfort.      TEST RESULTS:   Datalogging:  11.9 hours of use per day  Electrode Impedances: Cannot test - N22  Neural Response Testing: Cannot test - N22  Facial Stimulation: Absent  Tinnitus: Absent  Balance Problems: Absent  Pain/Discomfort: Absent  Strategies Tried: SPEAK    Programs in N6:  1. (53) ADRO with wind reduction  Telecoil is off. Volume and sensitivity are available in the remote.      Number of Channels per Program: 18 (22, 21, 3 & 2 are off)    COMMENTS: The microphone filters were changed on the N6 processor and a listening check was normal.      Programming changes were then made to ensure the volume was loud enough.  Patient did not understand setting individual threshold (T) levels.  Comfort (C) levels were measured using a simplified 3 point scale.  They increase on the most apical electrodes and a few other isolated electrodes.  Upon going live, T and C levels were globally increased 7 units to achieve comfort.  This was saved as map #53.      It was noted that patient's wife thought she had to push the button to turn on the processor each morning so she was changing him into program 2 by mistake.  Since he is at home in quiet most of the time, program #2 (SCAN) will be removed to simplify use.  This will ensure he is not an a directional mode by accident at times.    We also discussed that the cognitive/processing changes noted after the fall could be affecting his ability to understand speech as well.      SUMMARY AND RECOMMENDATIONS: Mr. Noland was seen for cochlear implant troubleshooting and programming.  Equipment is functioning as it should.  Programming changes were made to increase volume and to remove SCAN.  We discussed that his cognitive changes after his fall/seizures in January 2019 could affect his ability to understand speech.  An integrity test with Cochlear could be considered if concerns continue.      The patient and his wife expressed understanding and agreement with this plan.      Jennifer Pradhan, CATHY-A,  [employer]  Licensed Audiologist  [place] #[id number]

## 2019-11-06 ENCOUNTER — OFFICE VISIT (OUTPATIENT)
Dept: AUDIOLOGY | Facility: CLINIC | Age: 79
End: 2019-11-06
Payer: COMMERCIAL

## 2019-11-06 DIAGNOSIS — H90.3 SENSORY HEARING LOSS, BILATERAL: Primary | ICD-10-CM

## 2019-12-16 ENCOUNTER — TELEPHONE (OUTPATIENT)
Dept: AUDIOLOGY | Facility: CLINIC | Age: 79
End: 2019-12-16

## 2019-12-16 NOTE — TELEPHONE ENCOUNTER
Received call from patient's wife Danae.  Patient's battery is stuck on cochlear implant sound processor.  Gave her instructions on how to flip the lock on the processor.  This successfully released the battery and she was able to remove and replace it.  No further issues.      Jennifer Pradhan, CCC-A, [facility]  Licensed Audiologist  [place] #[id number]

## 2021-07-26 ENCOUNTER — TELEPHONE (OUTPATIENT)
Dept: AUDIOLOGY | Facility: CLINIC | Age: 81
End: 2021-07-26

## 2021-07-26 NOTE — TELEPHONE ENCOUNTER
Received release of information form (sent to scanning) from [facility] where patient is for long term care.  Patient is difficult to communicate with and they are asking for advice.    Left message for Antoni [phone].     Discussed that patient's baseline performance with cochlear implant is low (12% words, 26% sentences in 2018; did not understand task in 2019 possibly due to cognitive changes) and therefore they will need to use communication strategies to supplement what he hears with cochlear implant.  Advised use of clear masks to allow lipreading.  Discussed using written communication or programs like Live Transcribe to assist in communication.      Discussed that they could make an appt for patient by calling [phone] but programming alone would not likely results in significant improvement in ability to communicate with patient given that his baseline performance is poor.  Nonetheless we would be happy to see the patient is they would like assessment.       Discussed that if there are equipment issues, they should contact  [employer] for troubleshooting support [phone].     Patient's current equipment was fit in December 2017 so he may be eligible for new equipment in December 2022.      Provided call back # [phone] for questions.      Jennifer Pradhan, CCC-A, [facility] [place]  Licensed Audiologist  [place] #[id number]

## 2021-09-20 ENCOUNTER — OFFICE VISIT (OUTPATIENT)
Dept: AUDIOLOGY | Facility: CLINIC | Age: 81
End: 2021-09-20
Payer: COMMERCIAL

## 2021-09-20 DIAGNOSIS — H90.5 SNHL (SENSORINEURAL HEARING LOSS): Primary | ICD-10-CM

## 2021-09-20 DIAGNOSIS — H90.3 SENSORY HEARING LOSS, BILATERAL: Primary | ICD-10-CM

## 2021-09-20 PROCEDURE — 99207 PR ASSESSMENT FOR HEARING AID: CPT | Performed by: AUDIOLOGIST-HEARING AID FITTER
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
## 2021-11-29 ENCOUNTER — TELEPHONE (OUTPATIENT)
Dept: AUDIOLOGY | Facility: CLINIC | Age: 81
End: 2021-11-29
Payer: COMMERCIAL

## 2021-11-29 NOTE — TELEPHONE ENCOUNTER
Received called from patient's wife.      They received the replacement processor.  She will return the loaner to the clinic but reports it likely won't be until January 2022, as she is going through a move.      Due to patient's worsening dementia he is often refusing to wear the cochlear implant.  His wife reports they may discontinue use and use written communication, which patient prefers.      She will contact the clinic if additional support is needed.    Jennifer Pradhan, CCC-A, [facility]  Licensed Audiologist  [place] #[id number]

## 2021-12-13 ENCOUNTER — TELEPHONE (OUTPATIENT)
Dept: AUDIOLOGY | Facility: CLINIC | Age: 81
End: 2021-12-13
Payer: COMMERCIAL

## 2021-12-13 NOTE — TELEPHONE ENCOUNTER
Returned call to patient's wife.  She reported that Hernesto passed away last week.  She would like to return the clinic loaner cochlear implant equipment and donate his other cochlear implant equipment.  She will drop this off to the clinic at her convenience.  She indicated that she does not need a gift in kind form.       Jennifer Pradhan, CCC-A, [facility]  Licensed Audiologist  [place] #[id number]

## 2022-07-21 ENCOUNTER — TELEPHONE (OUTPATIENT)
Dept: AUDIOLOGY | Facility: CLINIC | Age: 82
End: 2022-07-21

## 2022-07-21 NOTE — TELEPHONE ENCOUNTER
Loaner N6 processor was dropped off at clinic and returned to [employer].    Patient's wife reports he has passed away and she donated the rest of his cochlear implant equipment.      Jennifer Pradhan, CCC-A, [facility]  Licensed Audiologist  [place] #[id number]